# Patient Record
Sex: FEMALE | Race: WHITE | NOT HISPANIC OR LATINO | Employment: UNEMPLOYED | URBAN - METROPOLITAN AREA
[De-identification: names, ages, dates, MRNs, and addresses within clinical notes are randomized per-mention and may not be internally consistent; named-entity substitution may affect disease eponyms.]

---

## 2022-06-23 ENCOUNTER — OFFICE VISIT (OUTPATIENT)
Dept: OBSTETRICS AND GYNECOLOGY | Facility: CLINIC | Age: 22
End: 2022-06-23

## 2022-06-23 VITALS — WEIGHT: 168 LBS | HEART RATE: 78 BPM | DIASTOLIC BLOOD PRESSURE: 70 MMHG | SYSTOLIC BLOOD PRESSURE: 132 MMHG

## 2022-06-23 DIAGNOSIS — N91.5 OLIGOMENORRHEA, UNSPECIFIED TYPE: Primary | ICD-10-CM

## 2022-06-23 DIAGNOSIS — Z30.011 ENCOUNTER FOR INITIAL PRESCRIPTION OF CONTRACEPTIVE PILLS: ICD-10-CM

## 2022-06-23 PROCEDURE — 99203 OFFICE O/P NEW LOW 30 MIN: CPT | Performed by: OBSTETRICS & GYNECOLOGY

## 2022-06-23 RX ORDER — NORGESTIMATE AND ETHINYL ESTRADIOL 7DAYSX3 LO
1 KIT ORAL DAILY
Qty: 84 TABLET | Refills: 3 | Status: SHIPPED | OUTPATIENT
Start: 2022-06-23 | End: 2022-08-03

## 2022-06-23 NOTE — PROGRESS NOTES
GYN Problem/Follow Up Visit    Chief Complaint   Patient presents with   • Menstrual Problem           HPI  Sylvia Jackson is a 21 y.o. female, No obstetric history on file., who presents for irreg menses. Menarche age 14. Always been irreg but seem to be spacing out more lately. She has not had a menses since February. Denies pain. Has had a little weight gain but nothing significant. Denies acne or unwanted hair. Never been sexually active.        Additional OB/GYN History   No LMP recorded (lmp unknown).  Current contraception: contraceptive methods: Abstinence  Allergies : Patient has no known allergies.     The additional following portions of the patient's history were reviewed and updated as appropriate: allergies, current medications, past family history, past medical history, past social history, past surgical history and problem list.    Review of Systems    I have reviewed and agree with the HPI, ROS, and historical information as entered above. SHANTEL Gibson    Objective   /70   Pulse 78   Wt 76.2 kg (168 lb)   LMP  (LMP Unknown)     Physical Exam  Vitals reviewed.   Neurological:      Mental Status: She is alert and oriented to person, place, and time.            Assessment and Plan    Diagnoses and all orders for this visit:    1. Oligomenorrhea, unspecified type (Primary)  -     17-Hydroxyprogesterone; Future  -     Comprehensive Metabolic Panel; Future  -     DHEA-Sulfate; Future  -     Testosterone, Bioavailable (M); Future  -     Insulin, Total; Future  -     Prolactin; Future  -     T4, Free; Future  -     TSH; Future  -     norgestimate-ethinyl estradiol (Ortho Tri-Cyclen Lo) 0.18/0.215/0.25 MG-25 MCG per tablet; Take 1 tablet by mouth Daily.  Dispense: 84 tablet; Refill: 3    2. Encounter for initial prescription of contraceptive pills  -     norgestimate-ethinyl estradiol (Ortho Tri-Cyclen Lo) 0.18/0.215/0.25 MG-25 MCG per tablet; Take 1 tablet by mouth Daily.  Dispense: 84  tablet; Refill: 3    will check fasting pcos panel. Discussed r/b/se of tx options such as provera and bc. Pt desires daily pill. Will recheck in two months, sooner if any concerns.     Counseling:  She understands the importance of having the above orders performed in a timely fashion.  She is encouraged to review her results online and/or contact or office if she has questions.     Follow Up:  Return in about 2 months (around 8/23/2022) for lab and med f/u, needs fasting pcos panel next week.      Kay Gooden, APRN  06/23/2022

## 2022-06-30 ENCOUNTER — LAB (OUTPATIENT)
Dept: LAB | Facility: HOSPITAL | Age: 22
End: 2022-06-30

## 2022-06-30 ENCOUNTER — LAB (OUTPATIENT)
Dept: OBSTETRICS AND GYNECOLOGY | Facility: CLINIC | Age: 22
End: 2022-06-30

## 2022-06-30 DIAGNOSIS — N91.5 OLIGOMENORRHEA, UNSPECIFIED TYPE: ICD-10-CM

## 2022-06-30 LAB
ALBUMIN SERPL-MCNC: 4.9 G/DL (ref 3.5–5.2)
ALBUMIN/GLOB SERPL: 1.7 G/DL
ALP SERPL-CCNC: 79 U/L (ref 39–117)
ALT SERPL W P-5'-P-CCNC: 47 U/L (ref 1–33)
ANION GAP SERPL CALCULATED.3IONS-SCNC: 14.6 MMOL/L (ref 5–15)
AST SERPL-CCNC: 36 U/L (ref 1–32)
BILIRUB SERPL-MCNC: 0.6 MG/DL (ref 0–1.2)
BUN SERPL-MCNC: 11 MG/DL (ref 6–20)
BUN/CREAT SERPL: 13.9 (ref 7–25)
CALCIUM SPEC-SCNC: 9.8 MG/DL (ref 8.6–10.5)
CHLORIDE SERPL-SCNC: 98 MMOL/L (ref 98–107)
CO2 SERPL-SCNC: 24.4 MMOL/L (ref 22–29)
CREAT SERPL-MCNC: 0.79 MG/DL (ref 0.57–1)
EGFRCR SERPLBLD CKD-EPI 2021: 110.7 ML/MIN/1.73
GLOBULIN UR ELPH-MCNC: 2.9 GM/DL
GLUCOSE SERPL-MCNC: 81 MG/DL (ref 65–99)
POTASSIUM SERPL-SCNC: 3.8 MMOL/L (ref 3.5–5.2)
PROLACTIN SERPL-MCNC: 12.7 NG/ML (ref 4.79–23.3)
PROT SERPL-MCNC: 7.8 G/DL (ref 6–8.5)
SODIUM SERPL-SCNC: 137 MMOL/L (ref 136–145)
T4 FREE SERPL-MCNC: 1.2 NG/DL (ref 0.93–1.7)
TSH SERPL DL<=0.05 MIU/L-ACNC: 3.32 UIU/ML (ref 0.27–4.2)

## 2022-06-30 PROCEDURE — 36415 COLL VENOUS BLD VENIPUNCTURE: CPT | Performed by: NURSE PRACTITIONER

## 2022-06-30 PROCEDURE — 82627 DEHYDROEPIANDROSTERONE: CPT | Performed by: OBSTETRICS & GYNECOLOGY

## 2022-06-30 PROCEDURE — 80053 COMPREHEN METABOLIC PANEL: CPT | Performed by: OBSTETRICS & GYNECOLOGY

## 2022-06-30 PROCEDURE — 83498 ASY HYDROXYPROGESTERONE 17-D: CPT | Performed by: OBSTETRICS & GYNECOLOGY

## 2022-06-30 PROCEDURE — 84146 ASSAY OF PROLACTIN: CPT | Performed by: OBSTETRICS & GYNECOLOGY

## 2022-06-30 PROCEDURE — 83525 ASSAY OF INSULIN: CPT | Performed by: OBSTETRICS & GYNECOLOGY

## 2022-06-30 PROCEDURE — 84410 TESTOSTERONE BIOAVAILABLE: CPT | Performed by: OBSTETRICS & GYNECOLOGY

## 2022-06-30 PROCEDURE — 84439 ASSAY OF FREE THYROXINE: CPT | Performed by: OBSTETRICS & GYNECOLOGY

## 2022-06-30 PROCEDURE — 84443 ASSAY THYROID STIM HORMONE: CPT | Performed by: OBSTETRICS & GYNECOLOGY

## 2022-07-01 LAB
DHEA-S SERPL-MCNC: 496 UG/DL (ref 110–433.2)
INSULIN SERPL-ACNC: 25 UIU/ML (ref 2.6–24.9)

## 2022-07-03 LAB — 17OHP SERPL-MCNC: 89 NG/DL

## 2022-07-09 LAB
TESTOST SERPL-MCNC: 38 NG/DL
TESTOSTERONE.FREE+WB MFR SERPL: 50.9 %
TESTOSTERONE.FREE+WB SERPL-MCNC: 19 NG/DL

## 2022-07-12 ENCOUNTER — TELEPHONE (OUTPATIENT)
Dept: OBSTETRICS AND GYNECOLOGY | Facility: CLINIC | Age: 22
End: 2022-07-12

## 2022-07-12 DIAGNOSIS — E34.9 ABNORMALITY OF HORMONE: ICD-10-CM

## 2022-07-12 DIAGNOSIS — N91.5 OLIGOMENORRHEA, UNSPECIFIED TYPE: Primary | ICD-10-CM

## 2022-07-12 NOTE — TELEPHONE ENCOUNTER
Patient mother called stated she needs referral placed for the endocrinologist if possible since daughter does not have a pcp currently. One she called was pj [facility] Wanted to know if you could place referral for her please since no pcp at the time.

## 2022-08-03 ENCOUNTER — OFFICE VISIT (OUTPATIENT)
Dept: FAMILY MEDICINE CLINIC | Age: 22
End: 2022-08-03

## 2022-08-03 VITALS — HEART RATE: 77 BPM | SYSTOLIC BLOOD PRESSURE: 132 MMHG | WEIGHT: 165.4 LBS | DIASTOLIC BLOOD PRESSURE: 71 MMHG

## 2022-08-03 DIAGNOSIS — R21 RASH: ICD-10-CM

## 2022-08-03 DIAGNOSIS — L72.3 SEBACEOUS CYST: ICD-10-CM

## 2022-08-03 DIAGNOSIS — E28.2 PCOS (POLYCYSTIC OVARIAN SYNDROME): Primary | ICD-10-CM

## 2022-08-03 DIAGNOSIS — L60.0 INGROWN TOENAIL OF RIGHT FOOT: ICD-10-CM

## 2022-08-03 PROBLEM — N91.2 AMENORRHEA: Status: ACTIVE | Noted: 2022-08-03

## 2022-08-03 PROCEDURE — 99204 OFFICE O/P NEW MOD 45 MIN: CPT | Performed by: FAMILY MEDICINE

## 2022-08-03 RX ORDER — NORGESTIMATE AND ETHINYL ESTRADIOL 7DAYSX3 LO
1 KIT ORAL DAILY
Qty: 84 TABLET | Refills: 3 | Status: SHIPPED | OUTPATIENT
Start: 2022-08-03 | End: 2023-08-03

## 2022-08-03 RX ORDER — CLINDAMYCIN PHOSPHATE 10 MG/G
1 GEL TOPICAL 2 TIMES DAILY
Qty: 1 EACH | Refills: 2 | Status: SHIPPED | OUTPATIENT
Start: 2022-08-03

## 2022-08-03 NOTE — ASSESSMENT & PLAN NOTE
Recurrent inflamed sebaceous cyst of the right medial proximal thigh.  No evidence of infection at this time but she would benefit from removal since it is in a bothersome location and has plagued her intermittently now for at least a couple of years.  I am going to place referral to General Surgery for removal.  Unfortunately, I do not think we will be able to get her in with one of her [facility] surgeons before 8/14/2022 when she has to return to school, so we will instead see if we can get her in with a [employer] general surgeon in [place] (she is going to school in [facility] currently).

## 2022-08-03 NOTE — ASSESSMENT & PLAN NOTE
Amenorrhea since February in the context of labs which are most consistent with PCOS (testosterone abnormalities and elevated DHEA-S).  We are going to start her on Ortho Tri-Cyclen Lo to try to manage her hormones more predictably.  I have sent in that prescription for her today.  We did discuss behavior modifications including increasing activity level (walking is great for this, as she likes to walk already) and watching carbohydrate and sugar intake which can affect insulin resistance.  She does have a very mild elevation of her AST/ALT which may indicate some early fatty liver, so we will watch that carefully going forward.  I will also plan to check an A1c with her next lab work to monitor her blood sugars.

## 2022-08-03 NOTE — ASSESSMENT & PLAN NOTE
She has an intermittent rash in the groin which she has been treating with topical clindamycin 1%.  Previously was receiving this from her dermatologist but she has not been back to see them in some time so she would like a refill on that today, which I am happy to provide.

## 2022-08-03 NOTE — PROGRESS NOTES
Chief Complaint  Establish Care (Hasn't had a period since February. Possible referral to Endo. )     45 minutes were spent in this encounter, including the face-to-face encounter, chart review, documentation, and coordination of care.    Subjective          Sylvia Jackson presents to [facility] today to establish care.  Her mother is Ana Jackson.    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  She was very active in high school on track, etc but has not been regularly doing athletics since starting college.      Menarche at age 14.  She has always had some problems with irregularity but they really started getting spaced out about a year ago, when she started college and had finished high school sports.  Her periods started to run from 42-50 days in between.  Her last menstrual period was February 2022.  She has never been sexually active.  She was seen by [facility] back in June at which time she had lab work done.  CMP showed very mild elevation of AST/ALT.  She did have a mildly elevated DHEA-S at 496 and elevated bioavailable testosterone at 19 (total testosterone was normal at 38).    She has previously been on Ortho Tri-Cyclen Lo.    She has had intermittent inflammation of a sebaceous cyst in the R groin for some years.  Very bothersome in its location.  At [facility], it has been inflamed for the past 2 weeks.    She has an ingrown toenail on the R hallux.      Review of Systems   Constitutional: Negative for chills, fatigue and fever.   HENT: Negative for congestion, hearing loss and rhinorrhea.    Eyes: Negative for pain and visual disturbance.   Respiratory: Negative for cough and shortness of breath.    Cardiovascular: Negative for chest pain and palpitations.   Gastrointestinal: Negative for abdominal pain, constipation, diarrhea, nausea and vomiting.   Genitourinary: Positive for menstrual problem (amenorrhea). Negative for dysuria and  hematuria.   Musculoskeletal: Negative for arthralgias and myalgias.   Skin: Positive for rash (treated with clindamycin 1% solution by Derm previously; she would like a refill).        Inflamed cyst R groin  Ingrown toenail R hallux   Neurological: Negative for weakness and numbness.   Psychiatric/Behavioral: Negative for dysphoric mood and sleep disturbance. The patient is not nervous/anxious.          Current Outpatient Medications:   •  norgestimate-ethinyl estradiol (Ortho Tri-Cyclen Lo) 0.18/0.215/0.25 MG-25 MCG per tablet, Take 1 tablet by mouth Daily., Disp: 84 tablet, Rfl: 3  •  clindamycin (Clindagel) 1 % gel, Apply 1 application topically to the appropriate area as directed 2 (Two) Times a Day., Disp: 1 each, Rfl: 2    Allergies:  Patient has no known allergies.      Objective   Vital Signs:   Vitals:    08/03/22 1450 08/03/22 1548   BP: 142/71 132/71   BP Location: Right arm Right arm   Patient Position: Sitting Sitting   Pulse: 84 77   Weight: 75 kg (165 lb 6.4 oz)      Physical Exam  Vitals reviewed.   Constitutional:       General: She is not in acute distress.     Appearance: Normal appearance. She is well-developed.   HENT:      Head: Normocephalic and atraumatic.      Right Ear: External ear normal.      Left Ear: External ear normal.      Mouth/Throat:      Mouth: Mucous membranes are moist.   Eyes:      Extraocular Movements: Extraocular movements intact.      Conjunctiva/sclera: Conjunctivae normal.      Pupils: Pupils are equal, round, and reactive to light.   Cardiovascular:      Rate and Rhythm: Normal rate and regular rhythm.      Heart sounds: No murmur heard.  Pulmonary:      Effort: Pulmonary effort is normal.      Breath sounds: Normal breath sounds. No wheezing, rhonchi or rales.   Abdominal:      General: Bowel sounds are normal. There is no distension.      Palpations: Abdomen is soft.      Tenderness: There is no abdominal tenderness.   Musculoskeletal:         General: Normal range  of motion.   Skin:     General: Skin is warm and dry.      Comments: +marble-sized round discrete cyst of the R medial proximal thigh, mobile, erythematous, mildly TTP, sinus tract visible but no drainage  R hallux with normal appearance but minimal amount of desquamation in the area she indicates has previously been inflamed   Neurological:      Mental Status: She is alert and oriented to person, place, and time.      Deep Tendon Reflexes: Reflexes normal.   Psychiatric:         Mood and Affect: Mood and affect normal.         Behavior: Behavior normal.         Thought Content: Thought content normal.         Judgment: Judgment normal.             Assessment and Plan    Diagnoses and all orders for this visit:    1. PCOS (polycystic ovarian syndrome) (Primary)  Assessment & Plan:  Amenorrhea since February in the context of labs which are most consistent with PCOS (testosterone abnormalities and elevated DHEA-S).  We are going to start her on Ortho Tri-Cyclen Lo to try to manage her hormones more predictably.  I have sent in that prescription for her today.  We did discuss behavior modifications including increasing activity level (walking is great for this, as she likes to walk already) and watching carbohydrate and sugar intake which can affect insulin resistance.  She does have a very mild elevation of her AST/ALT which may indicate some early fatty liver, so we will watch that carefully going forward.  I will also plan to check an A1c with her next lab work to monitor her blood sugars.    Orders:  -     norgestimate-ethinyl estradiol (Ortho Tri-Cyclen Lo) 0.18/0.215/0.25 MG-25 MCG per tablet; Take 1 tablet by mouth Daily.  Dispense: 84 tablet; Refill: 3    2. Sebaceous cyst  Assessment & Plan:  Recurrent inflamed sebaceous cyst of the right medial proximal thigh.  No evidence of infection at this time but she would benefit from removal since it is in a bothersome location and has plagued her intermittently now  for at least a couple of years.  I am going to place referral to General Surgery for removal.  Unfortunately, I do not think we will be able to get her in with one of her [facility] surgeons before 8/14/2022 when she has to return to school, so we will instead see if we can get her in with a [employer] general surgeon in [place] (she is going to school in [facility] currently).    Orders:  -     Ambulatory Referral to General Surgery    3. Rash  Assessment & Plan:  She has an intermittent rash in the groin which she has been treating with topical clindamycin 1%.  Previously was receiving this from her dermatologist but she has not been back to see them in some time so she would like a refill on that today, which I am happy to provide.    Orders:  -     clindamycin (Clindagel) 1 % gel; Apply 1 application topically to the appropriate area as directed 2 (Two) Times a Day.  Dispense: 1 each; Refill: 2    4. Ingrown toenail of right foot  Assessment & Plan:  Ingrown toenail appears to be pretty well resolved at this time.  Sylvia has been treating at home to good effect with warm peroxide soaks.  We discussed conservative treatment for ingrown toenails going forward including Epson salt soaks and using dental floss to gently work the edge of the offending nail free early on in the course.  She can let me know if she wishes to be seen by Podiatry in the future.        Follow Up   Return in about 3 months (around 11/3/2022) for Recheck.  Patient was given instructions and counseling regarding her condition or for health maintenance advice. Please see specific information pulled into the AVS if appropriate.

## 2022-08-03 NOTE — ASSESSMENT & PLAN NOTE
Ingrown toenail appears to be pretty well resolved at this time.  Sylvia has been treating at home to good effect with warm peroxide soaks.  We discussed conservative treatment for ingrown toenails going forward including Epson salt soaks and using dental floss to gently work the edge of the offending nail free early on in the course.  She can let me know if she wishes to be seen by Podiatry in the future.

## 2022-11-04 ENCOUNTER — OFFICE VISIT (OUTPATIENT)
Dept: FAMILY MEDICINE CLINIC | Age: 22
End: 2022-11-04

## 2022-11-04 VITALS
TEMPERATURE: 98.7 F | HEART RATE: 69 BPM | SYSTOLIC BLOOD PRESSURE: 131 MMHG | DIASTOLIC BLOOD PRESSURE: 77 MMHG | BODY MASS INDEX: 30.18 KG/M2 | HEIGHT: 62 IN | WEIGHT: 164 LBS

## 2022-11-04 DIAGNOSIS — E28.2 PCOS (POLYCYSTIC OVARIAN SYNDROME): Primary | ICD-10-CM

## 2022-11-04 DIAGNOSIS — E88.81 INSULIN RESISTANCE: ICD-10-CM

## 2022-11-04 PROBLEM — N91.2 AMENORRHEA: Status: RESOLVED | Noted: 2022-08-03 | Resolved: 2022-11-04

## 2022-11-04 PROBLEM — R21 RASH: Status: RESOLVED | Noted: 2022-08-03 | Resolved: 2022-11-04

## 2022-11-04 PROBLEM — L60.0 INGROWN TOENAIL OF RIGHT FOOT: Status: RESOLVED | Noted: 2022-08-03 | Resolved: 2022-11-04

## 2022-11-04 PROBLEM — L72.3 SEBACEOUS CYST: Status: RESOLVED | Noted: 2022-08-03 | Resolved: 2022-11-04

## 2022-11-04 PROCEDURE — 99213 OFFICE O/P EST LOW 20 MIN: CPT | Performed by: FAMILY MEDICINE

## 2022-11-04 NOTE — PROGRESS NOTES
"Chief Complaint  Contraception (F/u )    Subjective          Sylvia Jackson presents to [facility] today for f/u PCOS.    Menarche at age 14.  She has always had some problems with irregularity but they really started getting spaced out about a year ago, when she started college and had finished high school sports.  Her periods started to run from 42-50 days in between.  Her last menstrual period was February 2022.  She has never been sexually active.  She was seen by [facility] OB/[facility] back in June at which time she had lab work done.  CMP showed very mild elevation of AST/ALT.  She did have a mildly elevated DHEA-S at 496 and elevated bioavailable testosterone at 19 (total testosterone was normal at 38).    She was started on Ortho Tri-Cyclen.  She has started having regular cycles.  Periods last 4 days, medium flow all 4 days.  She noticed that her bras are a little tighter but growth was symmetric.  Initially some tenderness but no longer.      Current Outpatient Medications:   •  clindamycin (Clindagel) 1 % gel, Apply 1 application topically to the appropriate area as directed 2 (Two) Times a Day., Disp: 1 each, Rfl: 2  •  norgestimate-ethinyl estradiol (Ortho Tri-Cyclen Lo) 0.18/0.215/0.25 MG-25 MCG per tablet, Take 1 tablet by mouth Daily., Disp: 84 tablet, Rfl: 3    Allergies:  Patient has no known allergies.      Objective   Vital Signs:   Vitals:    11/04/22 1535   BP: 131/77   BP Location: Right arm   Patient Position: Sitting   Pulse: 69   Temp: 98.7 °F (37.1 °C)   Weight: 74.4 kg (164 lb)   Height: 157.5 cm (62\")       Physical Exam  Vitals reviewed.   Constitutional:       General: She is not in acute distress.     Appearance: Normal appearance. She is well-developed.   HENT:      Head: Normocephalic and atraumatic.      Right Ear: External ear normal.      Left Ear: External ear normal.   Eyes:      Extraocular Movements: Extraocular movements intact.     "  Conjunctiva/sclera: Conjunctivae normal.      Pupils: Pupils are equal, round, and reactive to light.   Cardiovascular:      Rate and Rhythm: Normal rate and regular rhythm.      Heart sounds: No murmur heard.  Pulmonary:      Effort: Pulmonary effort is normal.      Breath sounds: Normal breath sounds. No wheezing, rhonchi or rales.   Abdominal:      General: Bowel sounds are normal. There is no distension.      Palpations: Abdomen is soft.      Tenderness: There is no abdominal tenderness.   Musculoskeletal:         General: Normal range of motion.   Neurological:      Mental Status: She is alert.   Psychiatric:         Mood and Affect: Affect normal.          Lab Results   Component Value Date    GLUCOSE 81 06/30/2022    BUN 11 06/30/2022    CREATININE 0.79 06/30/2022    BCR 13.9 06/30/2022    K 3.8 06/30/2022    CO2 24.4 06/30/2022    CALCIUM 9.8 06/30/2022    ALBUMIN 4.90 06/30/2022    AST 36 (H) 06/30/2022    ALT 47 (H) 06/30/2022       No results found for: CHOL, CHLPL, TRIG, HDL, LDL, LDLDIRECT         Assessment and Plan    Diagnoses and all orders for this visit:    1. PCOS (polycystic ovarian syndrome) (Primary)  Assessment & Plan:  Doing extremely well on the Ortho Tri-Cyclen Lo.  Periods are regular and of moderate flow.  No pain or other adverse effects from the new medication.  She does not require refills today.  I do want to recheck some labs today including an A1c to look at insulin resistance related to the PCOS.  Labs ordered as below.  I will see her back in 6 months or sooner as needed    Orders:  -     CBC & Differential; Future  -     Comprehensive Metabolic Panel; Future    2. Insulin resistance  -     Hemoglobin A1c; Future      Follow Up   Return in about 6 months (around 5/4/2023) for Annual physical.  Patient was given instructions and counseling regarding her condition or for health maintenance advice. Please see specific information pulled into the AVS if appropriate.

## 2022-11-04 NOTE — ASSESSMENT & PLAN NOTE
Doing extremely well on the Ortho Tri-Cyclen Lo.  Periods are regular and of moderate flow.  No pain or other adverse effects from the new medication.  She does not require refills today.  I do want to recheck some labs today including an A1c to look at insulin resistance related to the PCOS.  Labs ordered as below.  I will see her back in 6 months or sooner as needed

## 2022-11-14 ENCOUNTER — TELEPHONE (OUTPATIENT)
Dept: FAMILY MEDICINE CLINIC | Age: 22
End: 2022-11-14

## 2022-11-23 ENCOUNTER — LAB (OUTPATIENT)
Dept: LAB | Facility: HOSPITAL | Age: 22
End: 2022-11-23

## 2022-11-23 DIAGNOSIS — E28.2 PCOS (POLYCYSTIC OVARIAN SYNDROME): ICD-10-CM

## 2022-11-23 DIAGNOSIS — E88.81 INSULIN RESISTANCE: ICD-10-CM

## 2022-11-23 LAB
ALBUMIN SERPL-MCNC: 4.8 G/DL (ref 3.5–5.2)
ALBUMIN/GLOB SERPL: 1.8 G/DL
ALP SERPL-CCNC: 55 U/L (ref 39–117)
ALT SERPL W P-5'-P-CCNC: 19 U/L (ref 1–33)
ANION GAP SERPL CALCULATED.3IONS-SCNC: 10.6 MMOL/L (ref 5–15)
AST SERPL-CCNC: 22 U/L (ref 1–32)
BASOPHILS # BLD AUTO: 0.04 10*3/MM3 (ref 0–0.2)
BASOPHILS NFR BLD AUTO: 0.7 % (ref 0–1.5)
BILIRUB SERPL-MCNC: 0.3 MG/DL (ref 0–1.2)
BUN SERPL-MCNC: 11 MG/DL (ref 6–20)
BUN/CREAT SERPL: 12 (ref 7–25)
CALCIUM SPEC-SCNC: 10 MG/DL (ref 8.6–10.5)
CHLORIDE SERPL-SCNC: 102 MMOL/L (ref 98–107)
CO2 SERPL-SCNC: 26.4 MMOL/L (ref 22–29)
CREAT SERPL-MCNC: 0.92 MG/DL (ref 0.57–1)
DEPRECATED RDW RBC AUTO: 39 FL (ref 37–54)
EGFRCR SERPLBLD CKD-EPI 2021: 92.2 ML/MIN/1.73
EOSINOPHIL # BLD AUTO: 0.15 10*3/MM3 (ref 0–0.4)
EOSINOPHIL NFR BLD AUTO: 2.6 % (ref 0.3–6.2)
ERYTHROCYTE [DISTWIDTH] IN BLOOD BY AUTOMATED COUNT: 12.2 % (ref 12.3–15.4)
GLOBULIN UR ELPH-MCNC: 2.6 GM/DL
GLUCOSE SERPL-MCNC: 87 MG/DL (ref 65–99)
HBA1C MFR BLD: 5.2 % (ref 4.8–5.6)
HCT VFR BLD AUTO: 43.2 % (ref 34–46.6)
HGB BLD-MCNC: 14.1 G/DL (ref 12–15.9)
IMM GRANULOCYTES # BLD AUTO: 0.03 10*3/MM3 (ref 0–0.05)
IMM GRANULOCYTES NFR BLD AUTO: 0.5 % (ref 0–0.5)
LYMPHOCYTES # BLD AUTO: 2.22 10*3/MM3 (ref 0.7–3.1)
LYMPHOCYTES NFR BLD AUTO: 37.8 % (ref 19.6–45.3)
MCH RBC QN AUTO: 28.8 PG (ref 26.6–33)
MCHC RBC AUTO-ENTMCNC: 32.6 G/DL (ref 31.5–35.7)
MCV RBC AUTO: 88.2 FL (ref 79–97)
MONOCYTES # BLD AUTO: 0.46 10*3/MM3 (ref 0.1–0.9)
MONOCYTES NFR BLD AUTO: 7.8 % (ref 5–12)
NEUTROPHILS NFR BLD AUTO: 2.97 10*3/MM3 (ref 1.7–7)
NEUTROPHILS NFR BLD AUTO: 50.6 % (ref 42.7–76)
NRBC BLD AUTO-RTO: 0 /100 WBC (ref 0–0.2)
PLATELET # BLD AUTO: 263 10*3/MM3 (ref 140–450)
PMV BLD AUTO: 10 FL (ref 6–12)
POTASSIUM SERPL-SCNC: 4.7 MMOL/L (ref 3.5–5.2)
PROT SERPL-MCNC: 7.4 G/DL (ref 6–8.5)
RBC # BLD AUTO: 4.9 10*6/MM3 (ref 3.77–5.28)
SODIUM SERPL-SCNC: 139 MMOL/L (ref 136–145)
WBC NRBC COR # BLD: 5.87 10*3/MM3 (ref 3.4–10.8)

## 2022-11-23 PROCEDURE — 80053 COMPREHEN METABOLIC PANEL: CPT

## 2022-11-23 PROCEDURE — 85025 COMPLETE CBC W/AUTO DIFF WBC: CPT

## 2022-11-23 PROCEDURE — 36415 COLL VENOUS BLD VENIPUNCTURE: CPT

## 2022-11-23 PROCEDURE — 83036 HEMOGLOBIN GLYCOSYLATED A1C: CPT

## 2023-05-26 ENCOUNTER — OFFICE VISIT (OUTPATIENT)
Dept: FAMILY MEDICINE CLINIC | Age: 23
End: 2023-05-26
Payer: COMMERCIAL

## 2023-05-26 VITALS
SYSTOLIC BLOOD PRESSURE: 135 MMHG | TEMPERATURE: 98.1 F | OXYGEN SATURATION: 99 % | HEART RATE: 77 BPM | BODY MASS INDEX: 30.36 KG/M2 | HEIGHT: 62 IN | DIASTOLIC BLOOD PRESSURE: 77 MMHG | WEIGHT: 165 LBS

## 2023-05-26 DIAGNOSIS — E28.2 PCOS (POLYCYSTIC OVARIAN SYNDROME): Primary | ICD-10-CM

## 2023-05-26 PROBLEM — E66.09 CLASS 1 OBESITY DUE TO EXCESS CALORIES WITHOUT SERIOUS COMORBIDITY WITH BODY MASS INDEX (BMI) OF 30.0 TO 30.9 IN ADULT: Status: RESOLVED | Noted: 2023-05-26 | Resolved: 2023-05-26

## 2023-05-26 PROBLEM — E66.09 CLASS 1 OBESITY DUE TO EXCESS CALORIES WITHOUT SERIOUS COMORBIDITY WITH BODY MASS INDEX (BMI) OF 30.0 TO 30.9 IN ADULT: Status: ACTIVE | Noted: 2023-05-26

## 2023-05-26 PROCEDURE — 99213 OFFICE O/P EST LOW 20 MIN: CPT | Performed by: FAMILY MEDICINE

## 2023-05-26 NOTE — PROGRESS NOTES
"Chief Complaint  Polycystic Ovary Syndrome    Subjective          Sylvia Jackson presents to [facility] today for f/u of routine problems.    She is on Ortho Tri-Cyclen Lo for PCOS and contraception.  She did have an A1c which came back at 5.2 back in November.  She is doing really well.  No real complaints today.  She is not following with Endocrinology.          Current Outpatient Medications:   •  clindamycin (Clindagel) 1 % gel, Apply 1 application topically to the appropriate area as directed 2 (Two) Times a Day., Disp: 1 each, Rfl: 2  •  norgestimate-ethinyl estradiol (Ortho Tri-Cyclen Lo) 0.18/0.215/0.25 MG-25 MCG per tablet, Take 1 tablet by mouth Daily., Disp: 84 tablet, Rfl: 3    Allergies:  Patient has no known allergies.      Objective   Vital Signs:   Vitals:    05/26/23 1402   BP: 135/77   BP Location: Right arm   Patient Position: Sitting   Cuff Size: Adult   Pulse: 77   Temp: 98.1 °F (36.7 °C)   TempSrc: Oral   SpO2: 99%   Weight: 74.8 kg (165 lb)   Height: 157.5 cm (62.01\")       Physical Exam  Vitals reviewed.   Constitutional:       General: She is not in acute distress.     Appearance: Normal appearance. She is well-developed.   HENT:      Head: Normocephalic and atraumatic.      Right Ear: External ear normal.      Left Ear: External ear normal.   Eyes:      Extraocular Movements: Extraocular movements intact.      Conjunctiva/sclera: Conjunctivae normal.      Pupils: Pupils are equal, round, and reactive to light.   Cardiovascular:      Rate and Rhythm: Normal rate and regular rhythm.      Heart sounds: No murmur heard.  Pulmonary:      Effort: Pulmonary effort is normal.      Breath sounds: Normal breath sounds. No wheezing, rhonchi or rales.   Abdominal:      General: Bowel sounds are normal. There is no distension.      Palpations: Abdomen is soft.      Tenderness: There is no abdominal tenderness.   Musculoskeletal:         General: Normal range of " motion.   Neurological:      Mental Status: She is alert.   Psychiatric:         Mood and Affect: Affect normal.          Lab Results   Component Value Date    GLUCOSE 87 11/23/2022    BUN 11 11/23/2022    CREATININE 0.92 11/23/2022    BCR 12.0 11/23/2022    K 4.7 11/23/2022    CO2 26.4 11/23/2022    CALCIUM 10.0 11/23/2022    ALBUMIN 4.80 11/23/2022    AST 22 11/23/2022    ALT 19 11/23/2022       No results found for: CHOL, CHLPL, TRIG, HDL, LDL, LDLDIRECT         Assessment and Plan    Diagnoses and all orders for this visit:    1. PCOS (polycystic ovarian syndrome) (Primary)  Assessment & Plan:  Stable on Ortho Tri-Cyclen Lo.  No refills needed today.  Continue to monitor.  She is doing quite well with no complaints!  I will see her back in November for an annual physical.        Follow Up   Return in about 6 months (around 11/26/2023) for Annual physical.  Patient was given instructions and counseling regarding her condition or for health maintenance advice. Please see specific information pulled into the AVS if appropriate.

## 2023-05-26 NOTE — ASSESSMENT & PLAN NOTE
Stable on Ortho Tri-Cyclen Lo.  No refills needed today.  Continue to monitor.  She is doing quite well with no complaints!  I will see her back in November for an annual physical.

## 2023-07-29 DIAGNOSIS — E28.2 PCOS (POLYCYSTIC OVARIAN SYNDROME): ICD-10-CM

## 2023-08-01 RX ORDER — NORGESTIMATE AND ETHINYL ESTRADIOL 7DAYSX3 LO
KIT ORAL
Qty: 84 TABLET | Refills: 3 | Status: SHIPPED | OUTPATIENT
Start: 2023-08-01

## 2023-11-13 DIAGNOSIS — E28.2 PCOS (POLYCYSTIC OVARIAN SYNDROME): ICD-10-CM

## 2023-11-14 RX ORDER — NORGESTIMATE AND ETHINYL ESTRADIOL 7DAYSX3 LO
KIT ORAL
Qty: 84 TABLET | Refills: 3 | OUTPATIENT
Start: 2023-11-14

## 2023-11-14 NOTE — TELEPHONE ENCOUNTER
Sylvia had a 1 year supply of this med ordered back in August.  Was there a problem with the rx?  Thanks, BZM

## 2023-12-12 ENCOUNTER — OFFICE VISIT (OUTPATIENT)
Dept: FAMILY MEDICINE CLINIC | Age: 23
End: 2023-12-12
Payer: COMMERCIAL

## 2023-12-12 VITALS
OXYGEN SATURATION: 99 % | WEIGHT: 168 LBS | BODY MASS INDEX: 30.91 KG/M2 | DIASTOLIC BLOOD PRESSURE: 82 MMHG | HEIGHT: 62 IN | HEART RATE: 65 BPM | SYSTOLIC BLOOD PRESSURE: 138 MMHG

## 2023-12-12 DIAGNOSIS — E28.2 PCOS (POLYCYSTIC OVARIAN SYNDROME): ICD-10-CM

## 2023-12-12 DIAGNOSIS — Z23 ENCOUNTER FOR IMMUNIZATION: ICD-10-CM

## 2023-12-12 DIAGNOSIS — Z00.00 ANNUAL PHYSICAL EXAM: Primary | ICD-10-CM

## 2023-12-12 NOTE — PROGRESS NOTES
"Chief Complaint  Annual Exam    Subjective          Sylvia Jackson presents to [facility] today for her annual physical.    She is UTD on Tdap (4/2014).  She is due for COVID, HPV vaccine, flu.  She is UTD on routine labs.      She is on COCs for PCOS.  Well controlled in general.    She has a spring break trip planned to [place].    Review of Systems   Constitutional:  Negative for chills, fatigue and fever.   HENT:  Negative for congestion, hearing loss and rhinorrhea.    Eyes:  Negative for pain and visual disturbance.   Respiratory:  Negative for cough and shortness of breath.    Cardiovascular:  Negative for chest pain and palpitations.   Gastrointestinal:  Negative for abdominal pain, constipation, diarrhea, nausea and vomiting.   Genitourinary:  Negative for dysuria and hematuria.   Musculoskeletal:  Negative for arthralgias and myalgias.   Skin:  Negative for rash.   Neurological:  Negative for weakness and numbness.   Psychiatric/Behavioral:  Negative for dysphoric mood and sleep disturbance. The patient is not nervous/anxious.          Current Outpatient Medications:     clindamycin (Clindagel) 1 % gel, Apply 1 application topically to the appropriate area as directed 2 (Two) Times a Day., Disp: 1 each, Rfl: 2    Tri-Lo-Pippa 0.18/0.215/0.25 MG-25 MCG per tablet, TAKE ONE TABLET BY MOUTH EVERY DAY, Disp: 84 tablet, Rfl: 3    Allergies:  Patient has no known allergies.      Objective   Vital Signs:   Vitals:    12/12/23 1258   BP: 138/82   BP Location: Left arm   Patient Position: Sitting   Cuff Size: Adult   Pulse: 65   SpO2: 99%   Weight: 76.2 kg (168 lb)   Height: 157.5 cm (62.01\")     Physical Exam  Vitals reviewed.   Constitutional:       General: She is not in acute distress.     Appearance: Normal appearance. She is well-developed.   HENT:      Head: Normocephalic and atraumatic.      Right Ear: External ear normal.      Left Ear: External ear normal.      " "Mouth/Throat:      Mouth: Mucous membranes are moist.   Eyes:      Extraocular Movements: Extraocular movements intact.      Conjunctiva/sclera: Conjunctivae normal.      Pupils: Pupils are equal, round, and reactive to light.   Cardiovascular:      Rate and Rhythm: Normal rate and regular rhythm.      Heart sounds: No murmur heard.  Pulmonary:      Effort: Pulmonary effort is normal.      Breath sounds: Normal breath sounds. No wheezing, rhonchi or rales.   Abdominal:      General: Bowel sounds are normal. There is no distension.      Palpations: Abdomen is soft.      Tenderness: There is no abdominal tenderness.   Musculoskeletal:         General: Normal range of motion.   Skin:     General: Skin is warm and dry.   Neurological:      Mental Status: She is alert and oriented to person, place, and time.      Deep Tendon Reflexes: Reflexes normal.   Psychiatric:         Mood and Affect: Mood and affect normal.         Behavior: Behavior normal.         Thought Content: Thought content normal.         Judgment: Judgment normal.          Lab Results   Component Value Date    GLUCOSE 87 11/23/2022    BUN 11 11/23/2022    CREATININE 0.92 11/23/2022    EGFR 92.2 11/23/2022    BCR 12.0 11/23/2022    K 4.7 11/23/2022    CO2 26.4 11/23/2022    CALCIUM 10.0 11/23/2022    ALBUMIN 4.80 11/23/2022    BILITOT 0.3 11/23/2022    AST 22 11/23/2022    ALT 19 11/23/2022       No results found for: \"CHOL\", \"CHLPL\", \"TRIG\", \"HDL\", \"LDL\", \"LDLDIRECT\"    Lab Results   Component Value Date    WBC 5.87 11/23/2022    HGB 14.1 11/23/2022    HCT 43.2 11/23/2022    MCV 88.2 11/23/2022     11/23/2022            Assessment and Plan    Diagnoses and all orders for this visit:    1. Annual physical exam (Primary)  Assessment & Plan:  She is UTD on Tdap (4/2014).  Tdap updated today, especially as she has a trip planned for [place] in the spring.  She is due for COVID, HPV vaccine, flu.  Declines COVID, flu and HPV today.  She will " check her records on HPV at home.  She is UTD on routine labs.        2. PCOS (polycystic ovarian syndrome)  Assessment & Plan:  On COCs.  Well controlled in general.      3. Encounter for immunization  -     Tdap Vaccine Greater Than or Equal To 6yo IM        Follow Up   Return in about 1 year (around 12/12/2024) for Annual physical.  Patient was given instructions and counseling regarding her condition or for health maintenance advice. Please see specific information pulled into the AVS if appropriate.         12/12/2023

## 2023-12-12 NOTE — ASSESSMENT & PLAN NOTE
She is UTD on Tdap (4/2014).  Tdap updated today, especially as she has a trip planned [place] in the spring.  She is due for COVID, HPV vaccine, flu.  Declines COVID, flu and HPV today.  She will check her records on HPV at home.  She is UTD on routine labs.

## 2024-07-26 DIAGNOSIS — E28.2 PCOS (POLYCYSTIC OVARIAN SYNDROME): ICD-10-CM

## 2024-07-26 RX ORDER — NORGESTIMATE AND ETHINYL ESTRADIOL 7DAYSX3 LO
1 KIT ORAL DAILY
Qty: 84 TABLET | Refills: 3 | Status: SHIPPED | OUTPATIENT
Start: 2024-07-26